# Patient Record
Sex: FEMALE
[De-identification: names, ages, dates, MRNs, and addresses within clinical notes are randomized per-mention and may not be internally consistent; named-entity substitution may affect disease eponyms.]

---

## 2023-08-23 PROBLEM — Z00.00 ENCOUNTER FOR PREVENTIVE HEALTH EXAMINATION: Status: ACTIVE | Noted: 2023-08-23

## 2023-08-25 ENCOUNTER — APPOINTMENT (OUTPATIENT)
Dept: GASTROENTEROLOGY | Facility: CLINIC | Age: 53
End: 2023-08-25
Payer: MEDICAID

## 2023-08-25 VITALS
SYSTOLIC BLOOD PRESSURE: 115 MMHG | BODY MASS INDEX: 27.31 KG/M2 | DIASTOLIC BLOOD PRESSURE: 70 MMHG | HEIGHT: 64 IN | RESPIRATION RATE: 15 BRPM | HEART RATE: 76 BPM | WEIGHT: 160 LBS | OXYGEN SATURATION: 98 % | TEMPERATURE: 97.6 F

## 2023-08-25 DIAGNOSIS — K83.1 OBSTRUCTION OF BILE DUCT: ICD-10-CM

## 2023-08-25 DIAGNOSIS — Z78.9 OTHER SPECIFIED HEALTH STATUS: ICD-10-CM

## 2023-08-25 DIAGNOSIS — K08.409 PARTIAL LOSS OF TEETH, UNSPECIFIED CAUSE, UNSPECIFIED CLASS: ICD-10-CM

## 2023-08-25 DIAGNOSIS — R10.11 RIGHT UPPER QUADRANT PAIN: ICD-10-CM

## 2023-08-25 DIAGNOSIS — K80.50 CALCULUS OF BILE DUCT W/OUT CHOLANGITIS OR CHOLECYSTITIS W/OUT OBSTRUCTION: ICD-10-CM

## 2023-08-25 PROCEDURE — 99204 OFFICE O/P NEW MOD 45 MIN: CPT

## 2023-08-25 NOTE — HISTORY OF PRESENT ILLNESS
[FreeTextEntry1] : Patient is a 53 year old female with a history of choledocholithiasis who presents due to referral from Dr. Emily Pitts for ERCP consult.  Patient states that she underwent lab evaluation that showed elevated LFTs.  She then underwent US of abdomen on 7/21/2023 that showed contracted gallbladder with gallstones and 1.0 cm CBD.  MR/MRCP with and without contrast on 08/16/2023 showed contracted gallbladder with multiple small gallstones, mild cental intrahepatic biliary ductal dilation, CBD dilation to 10 mm with 2 small 3 mm gallstones in the distal CBD.  Patient presents today stating that she is experiencing intermittent RUQ pain.  She is taking amoxicillin due to recent wisdom tooth extraction.  Patient underwent pre-surgical testing with Dr. Pitts today.  Patient states that her last colonoscopy was 2 years ago and was negative for any polyps.  Patient denies nausea, vomiting, changes in bowel habits, dark stool and BRBPR.  Patient admits to intact appetite and stable weight.  Patient denies family history of colon, gastric and pancreatic cancer.

## 2023-08-25 NOTE — REASON FOR VISIT
[Initial Evaluation] : an initial evaluation [Family Member] : family member [[employer] ] : provided by [employer]   [Interpreters_IDNumber] : [id number] [TWNoteComboBox1] : Lao

## 2023-08-25 NOTE — PHYSICAL EXAM
[Alert] : alert [No Acute Distress] : no acute distress [No Respiratory Distress] : no respiratory distress [Abdomen Tenderness] : non-tender [Abdomen Soft] : soft [Oriented To Time, Place, And Person] : oriented to person, place, and time

## 2023-08-25 NOTE — ASSESSMENT
[FreeTextEntry1] : Patient is a 53 year old female with a history of choledocholithiasis who presents due to referral from Dr. Emily Pitts for ERCP consult.  Patient to undergo ERCP for stone extraction at [facility].  R/B/C of procedure discussed with patient.  Escort for the procedure also reviewed.  Patient advised possiblity of stent placement and/or overnight stay in the hospital post procedure for observation.  Patient to undergo cholecystectomy with Dr. Pitts post ERCP.   Vivien GAMEZ; PA, am scribing for and in the presence of Dr. Yanick Downing the following sections: history of present illness, past medical/family/social history; review of systems; vital signs; physical exam; disposition.  Yanick GAMEZ MD, personally performed the services described in the documentation, reviewed the documentation recorded by the scribe in my presence and it accurately and completely records my words and actions.

## 2023-08-30 ENCOUNTER — NON-APPOINTMENT (OUTPATIENT)
Age: 53
End: 2023-08-30

## 2023-09-06 ENCOUNTER — TRANSCRIPTION ENCOUNTER (OUTPATIENT)
Age: 53
End: 2023-09-06

## 2023-09-06 VITALS
SYSTOLIC BLOOD PRESSURE: 120 MMHG | OXYGEN SATURATION: 98 % | TEMPERATURE: 97 F | RESPIRATION RATE: 16 BRPM | HEIGHT: 64 IN | HEART RATE: 60 BPM | DIASTOLIC BLOOD PRESSURE: 82 MMHG | WEIGHT: 159.17 LBS

## 2023-09-06 NOTE — ASU PATIENT PROFILE, ADULT - NSICDXPASTMEDICALHX_GEN_ALL_CORE_FT
PAST MEDICAL HISTORY:  Anxiety     Cholelithiasis     H/O varicose veins     HLD (hyperlipidemia)

## 2023-09-07 ENCOUNTER — TRANSCRIPTION ENCOUNTER (OUTPATIENT)
Age: 53
End: 2023-09-07

## 2023-09-07 ENCOUNTER — APPOINTMENT (OUTPATIENT)
Dept: GASTROENTEROLOGY | Facility: HOSPITAL | Age: 53
End: 2023-09-07
Payer: MEDICAID

## 2023-09-07 ENCOUNTER — OUTPATIENT (OUTPATIENT)
Dept: OUTPATIENT SERVICES | Facility: HOSPITAL | Age: 53
LOS: 1 days | Discharge: HOME | End: 2023-09-07
Payer: MEDICAID

## 2023-09-07 DIAGNOSIS — Z98.890 OTHER SPECIFIED POSTPROCEDURAL STATES: Chronic | ICD-10-CM

## 2023-09-07 DIAGNOSIS — Z86.79 PERSONAL HISTORY OF OTHER DISEASES OF THE CIRCULATORY SYSTEM: Chronic | ICD-10-CM

## 2023-09-07 PROCEDURE — 43262 ENDO CHOLANGIOPANCREATOGRAPH: CPT

## 2023-09-07 PROCEDURE — 88304 TISSUE EXAM BY PATHOLOGIST: CPT | Mod: 26

## 2023-09-07 PROCEDURE — 43264 ERCP REMOVE DUCT CALCULI: CPT

## 2023-09-07 PROCEDURE — 74328 X-RAY BILE DUCT ENDOSCOPY: CPT | Mod: 26

## 2023-09-07 DEVICE — HYDRATOME 44: Type: IMPLANTABLE DEVICE | Status: FUNCTIONAL

## 2023-09-07 DEVICE — BLLN EXTRCTR PRO RX-S 9-12MM: Type: IMPLANTABLE DEVICE | Status: FUNCTIONAL

## 2023-09-07 DEVICE — SURGIFLO HEMOSTATIC MATRIX KIT: Type: IMPLANTABLE DEVICE | Status: FUNCTIONAL

## 2023-09-07 RX ORDER — ONDANSETRON 8 MG/1
4 TABLET, FILM COATED ORAL EVERY 6 HOURS
Refills: 0 | Status: DISCONTINUED | OUTPATIENT
Start: 2023-09-07 | End: 2023-09-08

## 2023-09-07 RX ORDER — ACETAMINOPHEN 500 MG
1000 TABLET ORAL ONCE
Refills: 0 | Status: COMPLETED | OUTPATIENT
Start: 2023-09-08 | End: 2023-09-08

## 2023-09-07 RX ORDER — ACETAMINOPHEN 500 MG
1000 TABLET ORAL ONCE
Refills: 0 | Status: COMPLETED | OUTPATIENT
Start: 2023-09-07 | End: 2023-09-07

## 2023-09-07 RX ORDER — SODIUM CHLORIDE 9 MG/ML
1000 INJECTION, SOLUTION INTRAVENOUS
Refills: 0 | Status: DISCONTINUED | OUTPATIENT
Start: 2023-09-07 | End: 2023-09-08

## 2023-09-07 RX ORDER — HYDROMORPHONE HYDROCHLORIDE 2 MG/ML
0.25 INJECTION INTRAMUSCULAR; INTRAVENOUS; SUBCUTANEOUS ONCE
Refills: 0 | Status: DISCONTINUED | OUTPATIENT
Start: 2023-09-07 | End: 2023-09-07

## 2023-09-07 RX ORDER — HYDROMORPHONE HYDROCHLORIDE 2 MG/ML
0.2 INJECTION INTRAMUSCULAR; INTRAVENOUS; SUBCUTANEOUS EVERY 4 HOURS
Refills: 0 | Status: DISCONTINUED | OUTPATIENT
Start: 2023-09-07 | End: 2023-09-08

## 2023-09-07 RX ORDER — INFLUENZA VIRUS VACCINE 15; 15; 15; 15 UG/.5ML; UG/.5ML; UG/.5ML; UG/.5ML
0.5 SUSPENSION INTRAMUSCULAR ONCE
Refills: 0 | Status: DISCONTINUED | OUTPATIENT
Start: 2023-09-07 | End: 2023-09-08

## 2023-09-07 RX ORDER — OXYCODONE HYDROCHLORIDE 5 MG/1
2.5 TABLET ORAL ONCE
Refills: 0 | Status: DISCONTINUED | OUTPATIENT
Start: 2023-09-07 | End: 2023-09-07

## 2023-09-07 RX ADMIN — Medication 1000 MILLIGRAM(S): at 17:26

## 2023-09-07 RX ADMIN — OXYCODONE HYDROCHLORIDE 2.5 MILLIGRAM(S): 5 TABLET ORAL at 22:11

## 2023-09-07 RX ADMIN — HYDROMORPHONE HYDROCHLORIDE 0.2 MILLIGRAM(S): 2 INJECTION INTRAMUSCULAR; INTRAVENOUS; SUBCUTANEOUS at 12:51

## 2023-09-07 RX ADMIN — HYDROMORPHONE HYDROCHLORIDE 0.2 MILLIGRAM(S): 2 INJECTION INTRAMUSCULAR; INTRAVENOUS; SUBCUTANEOUS at 13:05

## 2023-09-07 RX ADMIN — HYDROMORPHONE HYDROCHLORIDE 0.2 MILLIGRAM(S): 2 INJECTION INTRAMUSCULAR; INTRAVENOUS; SUBCUTANEOUS at 18:50

## 2023-09-07 RX ADMIN — Medication 400 MILLIGRAM(S): at 17:06

## 2023-09-07 RX ADMIN — HYDROMORPHONE HYDROCHLORIDE 0.2 MILLIGRAM(S): 2 INJECTION INTRAMUSCULAR; INTRAVENOUS; SUBCUTANEOUS at 18:20

## 2023-09-07 RX ADMIN — Medication 400 MILLIGRAM(S): at 23:52

## 2023-09-07 RX ADMIN — HYDROMORPHONE HYDROCHLORIDE 0.25 MILLIGRAM(S): 2 INJECTION INTRAMUSCULAR; INTRAVENOUS; SUBCUTANEOUS at 13:32

## 2023-09-07 RX ADMIN — HYDROMORPHONE HYDROCHLORIDE 0.25 MILLIGRAM(S): 2 INJECTION INTRAMUSCULAR; INTRAVENOUS; SUBCUTANEOUS at 13:17

## 2023-09-07 RX ADMIN — OXYCODONE HYDROCHLORIDE 2.5 MILLIGRAM(S): 5 TABLET ORAL at 23:11

## 2023-09-07 NOTE — PATIENT PROFILE ADULT - PHONE #

## 2023-09-07 NOTE — PATIENT PROFILE ADULT - FALL HARM RISK - HARM RISK INTERVENTIONS

## 2023-09-07 NOTE — PATIENT PROFILE ADULT - IS PATIENT POST-MENOPAUSAL?
Cem EBONY Kevin,  It certainly was nice to meet you today.  Per our conversation you're having some pains in the chest.  This could represent heart blockages and the reason I am checking the angiogram of the heart.  In the interim increase the atorvastatin to 80 mg, and work on weight loss.  I will plan on seeing you afterwards.  Suresh Lee  
yes

## 2023-09-07 NOTE — BRIEF OPERATIVE NOTE - OPERATION/FINDINGS
Pt placed in reverse Trendelenburg w/ right side up. Omental attachments to GB were gently swept away. GB fundus retracted superiorly over dome of liver. Filmy adhesions between the GB & omentum/duo cauterized. GB infundibulum retracted laterally towards RLQ exposing Calot’s triangle. Critical view of safety obtained. Cystic duct and artery clipped and divided. Peritoneal attachments btw GB & liver bed  w/ electrocautery. Hemostasis achieved. No leakage of bile from cystic duct stump.    Small umbilical hernia noticed, umbilical stalk divided and defect closed with 0 Maxon suture

## 2023-09-07 NOTE — PRE-ANESTHESIA EVALUATION ADULT - BP NONINVASIVE SYSTOLIC (MM HG)
----- Message from [address] sent at 2/8/2023  8:58 AM EST -----  Regarding: care gap request  02/08/23 8:58 AM    Hello, our patient attached above has had CRC: Colonoscopy completed/performed  Please assist in updating the patient chart by pulling the Care Everywhere (CE) document  The date of service is 2/7/2023       Thank you,  Noemi Swann  [facility]
Upon review of the In Basket request we were able to locate, review, and update the patient chart as requested for CRC: Colonoscopy  Any additional questions or concerns should be emailed to the Practice Liaisons via the appropriate education email address, please do not reply via In Basket      Thank you  Gerda Trinh
120
[phone]

## 2023-09-07 NOTE — CONSULT NOTE ADULT - SUBJECTIVE AND OBJECTIVE BOX
54 yo F with PMH of Anxiety, HLD who was found to have Cholelithiases, Choledocholithiasis who underwent Laparoscopic Cholecystectomy and ERCP. Patient was also found to have an umbilical hernia which also was repaired. Patient did complain of some dizziness earlier today, which has resolved. She denies any abdominal pain or discomfort and states that her pain is being well managed. She did complain of urinary frequency, but denied any dysuria or hematuria. ROS as below.    PAST MEDICAL/SURGICAL HISTORY  PAST MEDICAL & SURGICAL HISTORY:  HLD (hyperlipidemia)  Anxiety  H/O varicose veins  Cholelithiasis  H/O salpingostomy  b/l  History of varicose veins  ablation    MEDICATIONS  (STANDING):  acetaminophen   IVPB .. 1000 milliGRAM(s) IV Intermittent once  influenza   Vaccine 0.5 milliLiter(s) IntraMuscular once  lactated ringers. 1000 milliLiter(s) (120 mL/Hr) IV Continuous <Continuous>    MEDICATIONS  (PRN):  HYDROmorphone  Injectable 0.2 milliGRAM(s) IV Push every 4 hours PRN Severe Pain (7 - 10)  ondansetron Injectable 4 milliGRAM(s) IV Push every 6 hours PRN Nausea      REVIEW OF SYSTEMS:  CONSTITUTIONAL: No fever, weight loss, or fatigue  RESPIRATORY: No cough; No shortness of breath  CARDIOVASCULAR: No chest pain, palpitations, but did have some dizziness  GASTROINTESTINAL: No abdominal or epigastric pain. No nausea, vomiting  GENITOURINARY: +frequency  NEUROLOGICAL: No headaches, memory loss, loss of strength  MUSCULOSKELETAL: No joint pain or swelling; No muscle, back, or extremity pain      T(C): 36.3 (09-07-23 @ 18:19), Max: 36.4 (09-07-23 @ 13:17)  HR: 66 (09-07-23 @ 18:19) (54 - 66)  BP: 137/90 (09-07-23 @ 18:19) (96/62 - 137/90)  RR: 18 (09-07-23 @ 18:19) (13 - 20)  SpO2: 96% (09-07-23 @ 18:19) (96% - 99%)  Wt(kg): --Vital Signs Last 24 Hrs  T(C): 36.3 (07 Sep 2023 18:19), Max: 36.4 (07 Sep 2023 13:17)  T(F): 97.4 (07 Sep 2023 18:19), Max: 97.5 (07 Sep 2023 13:17)  HR: 66 (07 Sep 2023 18:19) (54 - 66)  BP: 137/90 (07 Sep 2023 18:19) (96/62 - 137/90)  BP(mean): 76 (07 Sep 2023 14:54) (74 - 78)  RR: 18 (07 Sep 2023 18:19) (13 - 20)  SpO2: 96% (07 Sep 2023 18:19) (96% - 99%)    Parameters below as of 07 Sep 2023 18:19  Patient On (Oxygen Delivery Method): room air      Oxygen Saturation Index= Unable to calculate   [Based on FiO2 = Unknown, SpO2 = 96(09/07/2023 18:19), MAP = Unknown]  Daily Height in cm: 162.56 (07 Sep 2023 14:54)    Daily     PHYSICAL EXAM:  GENERAL: NAD  NERVOUS SYSTEM:  Alert & Oriented X3  CHEST/LUNG: CTAB; No rales, rhonchi, wheezing, or rubs  HEART: Regular rate and rhythm  ABDOMEN: Soft, Nontender, Nondistended; Bowel sounds hypoactive; incisions intact, dry and clean  EXTREMITIES: No clubbing, cyanosis, or edema      CAPILLARY BLOOD GLUCOSE    RADIOLOGY & ADDITIONAL TESTS:  ERCP Findings:    The duodenoscope was passed into the second portion of the duodenum. The major    papilla was identified and appeared unremarkable.  Using a sphincterotome a wire    was passed into the bile duct, the bile duct was readily cannulated and a    cholangiogram was produced by injecting contrast -- at least one filling defect    seen in the distal duct.  A sphincterotomy was performed without complications.    A balloon was used to perform a sweep of the bile duct. Two small stones were    removed. Occlusion cholangiogram showed no other filling defects.

## 2023-09-07 NOTE — PATIENT PROFILE ADULT - FUNCTIONAL ASSESSMENT - BASIC MOBILITY 6.
Not able to assess (calculate score using AMPAC averaging method) 3-calculated by average/Not able to assess (calculate score using [facility] averaging method)

## 2023-09-07 NOTE — BRIEF OPERATIVE NOTE - VENOUS THROMBOEMBOLISM PROPHYLAXIS THERAPY
SCDs Hatchet Flap Text: The defect edges were debeveled with a #15 scalpel blade.  Given the location of the defect, shape of the defect and the proximity to free margins a hatchet flap was deemed most appropriate.  Using a sterile surgical marker, an appropriate hatchet flap was drawn incorporating the defect and placing the expected incisions within the relaxed skin tension lines where possible.    The area thus outlined was incised deep to adipose tissue with a #15 scalpel blade.  The skin margins were undermined to an appropriate distance in all directions utilizing iris scissors.

## 2023-09-07 NOTE — CONSULT NOTE ADULT - ASSESSMENT
52 yo F with PMH of Anxiety, HLD who was found to have Cholelithiases, Choledocholithiasis who underwent Laparoscopic Cholecystectomy and ERCP. Patient was also found to have an umbilical hernia which also was repaired. Patient is doing well post-op, other than an episode of dizziness and urinary frequency. She denied any dysuria or hematuria but if patient still continues to have urinary urgency, we would recommend getting a urinalysis to further investigate. If positive, would recommend sending Urine Culture and starting Ceftriaxone. Follow up on WBC. Continue with adequate pain/nausea management regimen. Post-op care as per primary surgical team.    Thank you for this consultation. 54 yo F with PMH of Anxiety, HLD who was found to have Cholelithiases, Choledocholithiasis who underwent Laparoscopic Cholecystectomy and ERCP. Patient was also found to have an umbilical hernia which also was repaired. Patient is doing well post-op, other than an episode of dizziness and urinary frequency. She denied any dysuria or hematuria but if patient still continues to have urinary urgency, we would recommend getting a urinalysis to further investigate. If positive, would recommend sending Urine Culture and starting Ceftriaxone. Follow up on WBC. Continue with adequate pain/nausea management regimen. Post-op care as per primary surgical team.    Thank you for this consultation and please feel free to reach out with any questions.

## 2023-09-07 NOTE — PRE-ANESTHESIA EVALUATION ADULT - NSANTHPEFT_GEN_ALL_CORE
General: Appearance is consistent with chronological age. No abnormal facies.  EENT: Anicteric sclera; oropharynx clear, moist mucus membranes  Cardiovascular:  Rate and rhythm evaluated  Respiratory: Unlabored breathing  Neurological: Awake and alert, moves all extremities  Constitutional: MET>4
RRR  CTLAB

## 2023-09-07 NOTE — ASU DISCHARGE PLAN (ADULT/PEDIATRIC) - ASU DC SPECIAL INSTRUCTIONSFT
General Discharge Instructions:  Please resume all regular home medications unless specifically advised not to take a particular medication. Please take any new medications as prescribed.  Please get plenty of rest, continue to ambulate several times per day, and drink adequate amounts of fluids. Avoid lifting weights greater than 5-10 lbs until you follow-up with your surgeon, who will instruct you further regarding activity restrictions.  Avoid driving or operating heavy machinery while taking pain medications.  Please follow-up with your surgeon and Primary Care Provider (PCP) as advised.    Warning Signs:  Please call your doctor or nurse practitioner if you experience the following:  *You experience new chest pain, pressure, squeezing or tightness.  *New or worsening cough, shortness of breath, or wheeze.  If you are vomiting and cannot keep down fluids or your medications.  *You are getting dehydrated due to continued vomiting, diarrhea, or other reasons. Signs of dehydration include dry mouth, rapid heartbeat, or feeling dizzy or faint when standing.  You see blood or dark/black material when you vomit or have a bowel movement.  You experience burning when you urinate, have blood in your urine, or experience a discharge.  Your pain is not improving within 8-12 hours or is not gone within 24 hours. Call or return immediately if your pain is getting worse, changes location, or moves to your chest or back.  You have shaking chills, or fever greater than 101.5 degrees Fahrenheit or 38 degrees Celsius.  Any change in your symptoms, or any new symptoms that concern you.

## 2023-09-07 NOTE — ASU DISCHARGE PLAN (ADULT/PEDIATRIC) - NS MD DC FALL RISK RISK
For information on Fall & Injury Prevention, visit: [email or web address] OR  [email or web address] OR  https://www.cdc.gov/steadi/patient.html

## 2023-09-07 NOTE — PROGRESS NOTE ADULT - SUBJECTIVE AND OBJECTIVE BOX
Procedure: Laparoscopic cholecystectomy  Surgeon: Dr. Pitts    S: Pt seen in PACU. Pt has no complaints. Denies CP, SOB, EWING, calf tenderness. Pain controlled with medication.    O:  T(C): 36.4 (09-07-23 @ 13:17), Max: 36.4 (09-07-23 @ 13:17)  T(F): 97.5 (09-07-23 @ 13:17), Max: 97.5 (09-07-23 @ 13:17)  HR: 56 (09-07-23 @ 13:17) (55 - 58)  BP: 97/59 (09-07-23 @ 13:17) (97/59 - 109/59)  RR: 13 (09-07-23 @ 13:17) (13 - 20)  SpO2: 98% (09-07-23 @ 13:17) (97% - 99%)  Wt(kg): --            Gen: NAD, resting comfortably in bed  C/V: NSR  Pulm: Nonlabored breathing, no respiratory distress  Abd: soft, NT/ND Incision: clean, dry, intact   Extrem: WWP, no calf edema, SCDs in place      A/P: 53yFemale s/p above procedure  Diet: Regular, low fat   Pain/nausea control  Dispo plan: home

## 2023-09-07 NOTE — BRIEF OPERATIVE NOTE - NSICDXBRIEFPOSTOP_GEN_ALL_CORE_FT
POST-OP DIAGNOSIS:  Cholelithiases 07-Sep-2023 12:34:58  Cynthia Lee  Choledocholithiasis 07-Sep-2023 12:35:06  Cynthia Lee  Umbilical hernia 07-Sep-2023 12:35:28  Cynthia Lee

## 2023-09-07 NOTE — PRE-ANESTHESIA EVALUATION ADULT - NSANTHOSAYNRD_GEN_A_CORE
No. CAPO screening performed.  STOP BANG Legend: 0-2 = LOW Risk; 3-4 = INTERMEDIATE Risk; 5-8 = HIGH Risk
No. CAPO screening performed.  STOP BANG Legend: 0-2 = LOW Risk; 3-4 = INTERMEDIATE Risk; 5-8 = HIGH Risk

## 2023-09-07 NOTE — BRIEF OPERATIVE NOTE - NSICDXBRIEFPROCEDURE_GEN_ALL_CORE_FT
PROCEDURES:  Laparoscopic cholecystectomy 07-Sep-2023 12:34:19  Cynthia Lee  Repair, hernia, umbilical, with lipectomy 07-Sep-2023 12:35:17  Cynthia Lee

## 2023-09-07 NOTE — PRE-ANESTHESIA EVALUATION ADULT - NSANTHADDINFOFT_GEN_ALL_CORE
Risks, benefits and alternatives including but not limited to nausea, bleeding, and local trauma/positioning related injury discussed. All questions answered. The patient agrees to proceed. re-consented. however, previous still valid.

## 2023-09-07 NOTE — BRIEF OPERATIVE NOTE - NSICDXBRIEFPREOP_GEN_ALL_CORE_FT
PRE-OP DIAGNOSIS:  Cholelithiasis 07-Sep-2023 12:34:28  Cynthia Lee  Choledocholithiasis 07-Sep-2023 12:34:49  Cynthia Lee

## 2023-09-08 ENCOUNTER — TRANSCRIPTION ENCOUNTER (OUTPATIENT)
Age: 53
End: 2023-09-08

## 2023-09-08 VITALS
OXYGEN SATURATION: 97 % | SYSTOLIC BLOOD PRESSURE: 119 MMHG | TEMPERATURE: 98 F | DIASTOLIC BLOOD PRESSURE: 75 MMHG | RESPIRATION RATE: 18 BRPM | HEART RATE: 70 BPM

## 2023-09-08 LAB
ALBUMIN SERPL ELPH-MCNC: 3.3 G/DL — SIGNIFICANT CHANGE UP (ref 3.3–5)
ALP SERPL-CCNC: 68 U/L — SIGNIFICANT CHANGE UP (ref 40–120)
ALT FLD-CCNC: 24 U/L — SIGNIFICANT CHANGE UP (ref 10–45)
ANION GAP SERPL CALC-SCNC: 8 MMOL/L — SIGNIFICANT CHANGE UP (ref 5–17)
AST SERPL-CCNC: 22 U/L — SIGNIFICANT CHANGE UP (ref 10–40)
BASOPHILS # BLD AUTO: 0.02 K/UL — SIGNIFICANT CHANGE UP (ref 0–0.2)
BASOPHILS NFR BLD AUTO: 0.2 % — SIGNIFICANT CHANGE UP (ref 0–2)
BILIRUB DIRECT SERPL-MCNC: 0.2 MG/DL — SIGNIFICANT CHANGE UP (ref 0–0.3)
BILIRUB INDIRECT FLD-MCNC: 0.4 MG/DL — SIGNIFICANT CHANGE UP (ref 0.2–1)
BILIRUB SERPL-MCNC: 0.6 MG/DL — SIGNIFICANT CHANGE UP (ref 0.2–1.2)
BUN SERPL-MCNC: 11 MG/DL — SIGNIFICANT CHANGE UP (ref 7–23)
CALCIUM SERPL-MCNC: 8.8 MG/DL — SIGNIFICANT CHANGE UP (ref 8.4–10.5)
CHLORIDE SERPL-SCNC: 104 MMOL/L — SIGNIFICANT CHANGE UP (ref 96–108)
CO2 SERPL-SCNC: 25 MMOL/L — SIGNIFICANT CHANGE UP (ref 22–31)
CREAT SERPL-MCNC: 0.75 MG/DL — SIGNIFICANT CHANGE UP (ref 0.5–1.3)
EGFR: 95 ML/MIN/1.73M2 — SIGNIFICANT CHANGE UP
EOSINOPHIL # BLD AUTO: 0 K/UL — SIGNIFICANT CHANGE UP (ref 0–0.5)
EOSINOPHIL NFR BLD AUTO: 0 % — SIGNIFICANT CHANGE UP (ref 0–6)
GLUCOSE SERPL-MCNC: 101 MG/DL — HIGH (ref 70–99)
HCT VFR BLD CALC: 38 % — SIGNIFICANT CHANGE UP (ref 34.5–45)
HGB BLD-MCNC: 12.8 G/DL — SIGNIFICANT CHANGE UP (ref 11.5–15.5)
IMM GRANULOCYTES NFR BLD AUTO: 0.3 % — SIGNIFICANT CHANGE UP (ref 0–0.9)
LYMPHOCYTES # BLD AUTO: 18.1 % — SIGNIFICANT CHANGE UP (ref 13–44)
LYMPHOCYTES # BLD AUTO: 2.12 K/UL — SIGNIFICANT CHANGE UP (ref 1–3.3)
MAGNESIUM SERPL-MCNC: 1.8 MG/DL — SIGNIFICANT CHANGE UP (ref 1.6–2.6)
MCHC RBC-ENTMCNC: 29.2 PG — SIGNIFICANT CHANGE UP (ref 27–34)
MCHC RBC-ENTMCNC: 33.7 GM/DL — SIGNIFICANT CHANGE UP (ref 32–36)
MCV RBC AUTO: 86.6 FL — SIGNIFICANT CHANGE UP (ref 80–100)
MONOCYTES # BLD AUTO: 0.86 K/UL — SIGNIFICANT CHANGE UP (ref 0–0.9)
MONOCYTES NFR BLD AUTO: 7.3 % — SIGNIFICANT CHANGE UP (ref 2–14)
NEUTROPHILS # BLD AUTO: 8.7 K/UL — HIGH (ref 1.8–7.4)
NEUTROPHILS NFR BLD AUTO: 74.1 % — SIGNIFICANT CHANGE UP (ref 43–77)
NRBC # BLD: 0 /100 WBCS — SIGNIFICANT CHANGE UP (ref 0–0)
PHOSPHATE SERPL-MCNC: 3.6 MG/DL — SIGNIFICANT CHANGE UP (ref 2.5–4.5)
PLATELET # BLD AUTO: 258 K/UL — SIGNIFICANT CHANGE UP (ref 150–400)
POTASSIUM SERPL-MCNC: 4.3 MMOL/L — SIGNIFICANT CHANGE UP (ref 3.5–5.3)
POTASSIUM SERPL-SCNC: 4.3 MMOL/L — SIGNIFICANT CHANGE UP (ref 3.5–5.3)
PROT SERPL-MCNC: 6.2 G/DL — SIGNIFICANT CHANGE UP (ref 6–8.3)
RBC # BLD: 4.39 M/UL — SIGNIFICANT CHANGE UP (ref 3.8–5.2)
RBC # FLD: 12.8 % — SIGNIFICANT CHANGE UP (ref 10.3–14.5)
SODIUM SERPL-SCNC: 137 MMOL/L — SIGNIFICANT CHANGE UP (ref 135–145)
WBC # BLD: 11.73 K/UL — HIGH (ref 3.8–10.5)
WBC # FLD AUTO: 11.73 K/UL — HIGH (ref 3.8–10.5)

## 2023-09-08 PROCEDURE — 74328 X-RAY BILE DUCT ENDOSCOPY: CPT

## 2023-09-08 PROCEDURE — 80076 HEPATIC FUNCTION PANEL: CPT

## 2023-09-08 PROCEDURE — C1769: CPT

## 2023-09-08 PROCEDURE — 88304 TISSUE EXAM BY PATHOLOGIST: CPT

## 2023-09-08 PROCEDURE — 47562 LAPAROSCOPIC CHOLECYSTECTOMY: CPT

## 2023-09-08 PROCEDURE — 80048 BASIC METABOLIC PNL TOTAL CA: CPT

## 2023-09-08 PROCEDURE — 85025 COMPLETE CBC W/AUTO DIFF WBC: CPT

## 2023-09-08 PROCEDURE — 84100 ASSAY OF PHOSPHORUS: CPT

## 2023-09-08 PROCEDURE — C1889: CPT

## 2023-09-08 PROCEDURE — 36415 COLL VENOUS BLD VENIPUNCTURE: CPT

## 2023-09-08 PROCEDURE — 83735 ASSAY OF MAGNESIUM: CPT

## 2023-09-08 RX ORDER — MAGNESIUM SULFATE 500 MG/ML
1 VIAL (ML) INJECTION ONCE
Refills: 0 | Status: COMPLETED | OUTPATIENT
Start: 2023-09-08 | End: 2023-09-08

## 2023-09-08 RX ADMIN — Medication 100 GRAM(S): at 14:30

## 2023-09-08 RX ADMIN — Medication 400 MILLIGRAM(S): at 10:21

## 2023-09-08 RX ADMIN — Medication 1000 MILLIGRAM(S): at 13:00

## 2023-09-08 RX ADMIN — Medication 400 MILLIGRAM(S): at 04:40

## 2023-09-08 NOTE — PROGRESS NOTE ADULT - ASSESSMENT
54 yo F with PMH of Anxiety, HLD who was found to have Cholelithiases, Choledocholithiasis who underwent Laparoscopic Cholecystectomy and ERCP. Patient was also found to have an umbilical hernia which also was repaired. Patient is doing well post-op other than urinary frequency and some mild bloating. She denied any dysuria or hematuria but as patient still continues to have urinary urgency and frequency, we would recommend getting a urinalysis to further investigate. If positive, would recommend sending Urine Culture and starting Ceftriaxone. Follow up on WBC. Continue with adequate pain/nausea management regimen. Advance diet as tolerated. Post-op care as per primary surgical team.    Thank you for this consultation and please feel free to reach out with any questions.
53 year old woman with past medical history of cholelithiasis, choledocholithiasis presents for elective lap glenn on 9/7. Plan for ERCP post lap glenn.    CLD/IVF  Pain/nausea control prn  ERCP 9/7  SCDs  TOV pending  AM labs

## 2023-09-08 NOTE — DISCHARGE NOTE NURSING/CASE MANAGEMENT/SOCIAL WORK - PATIENT PORTAL LINK FT
You can access the FollowMyHealth Patient Portal offered by [facility] by registering at the following website: [email or web address]. By joining [facility]’s FollowMyHealth portal, you will also be able to view your health information using other applications (apps) compatible with our system.

## 2023-09-08 NOTE — PROGRESS NOTE ADULT - SUBJECTIVE AND OBJECTIVE BOX
54 yo F with PMH of Anxiety, HLD who was found to have Cholelithiases, Choledocholithiasis who underwent Laparoscopic Cholecystectomy and ERCP. Patient was also found to have an umbilical hernia which also was repaired. Patient still complains of urinary frequency but now states that she also feels bloated and has mild abdominal pain. Denies any nausea, vomiting, chest pain or shortness of breath.    PAST MEDICAL/SURGICAL HISTORY  PAST MEDICAL & SURGICAL HISTORY:  HLD (hyperlipidemia)  Anxiety  H/O varicose veins  Cholelithiasis  H/O salpingostomy  b/l  History of varicose veins  ablation    MEDICATIONS  (STANDING):  acetaminophen   IVPB .. 1000 milliGRAM(s) IV Intermittent once  influenza   Vaccine 0.5 milliLiter(s) IntraMuscular once  lactated ringers. 1000 milliLiter(s) (120 mL/Hr) IV Continuous <Continuous>    MEDICATIONS  (PRN):  HYDROmorphone  Injectable 0.2 milliGRAM(s) IV Push every 4 hours PRN Severe Pain (7 - 10)  ondansetron Injectable 4 milliGRAM(s) IV Push every 6 hours PRN Nausea    T(C): 36.2 (09-08-23 @ 05:16), Max: 36.4 (09-07-23 @ 13:17)  HR: 68 (09-08-23 @ 05:16) (54 - 68)  BP: 124/76 (09-08-23 @ 05:16) (96/62 - 137/90)  RR: 18 (09-08-23 @ 05:16) (13 - 20)  SpO2: 97% (09-08-23 @ 05:16) (96% - 99%)  Wt(kg): --Vital Signs Last 24 Hrs  T(C): 36.2 (08 Sep 2023 05:16), Max: 36.4 (07 Sep 2023 13:17)  T(F): 97.2 (08 Sep 2023 05:16), Max: 97.6 (08 Sep 2023 00:33)  HR: 68 (08 Sep 2023 05:16) (54 - 68)  BP: 124/76 (08 Sep 2023 05:16) (96/62 - 137/90)  BP(mean): 97 (07 Sep 2023 21:14) (74 - 97)  RR: 18 (08 Sep 2023 05:16) (13 - 20)  SpO2: 97% (08 Sep 2023 05:16) (96% - 99%)    Parameters below as of 08 Sep 2023 05:16  Patient On (Oxygen Delivery Method): room air      Oxygen Saturation Index= Unable to calculate   [Based on FiO2 = Unknown, SpO2 = 97(09/08/2023 05:16), MAP = Unknown]  Daily Height in cm: 162.56 (07 Sep 2023 14:54)    Daily     PHYSICAL EXAM:  GENERAL: NAD  NERVOUS SYSTEM:  Alert & Oriented X3  CHEST/LUNG: CTAB; No rales, rhonchi, wheezing, or rubs  HEART: Regular rate and rhythm  ABDOMEN: Soft, mild tenderness over incisions, Nondistended; Bowel sounds hypoactive; incisions intact, dry and clean  EXTREMITIES: No clubbing, cyanosis, or edema    CAPILLARY BLOOD GLUCOSE      LABS:  CBC   09-08-23 @ 08:19  Hematcorit 38.0  Hemoglobin 12.8  Mean Cell Hemoglobin 29.2  Platelet Count-Automated 258  RBC Count 4.39  Red Cell Distrib Width 12.8  Wbc Count 11.73      BMP  09-08-23 @ 08:19  Anion Gap. Serum 8  Blood Urea Nitrogen,Serm 11  Calcium, Total Serum 8.8  Carbon Dioxide, Serum 25  Chloride, Serum 104  Creatinine, Serum 0.75  eGFR in  --  eGFR in Non Afican American --  Gloucose, serum 101  Potassium, Serum 4.3  Sodium, Serum 137    CMP  09-08-23 @ 08:19  Kary Aminotransferase(ALT/SGPT)24  Albumin, Serum 3.3  Alkaline Phosphatase, Serum 68  Anion Gap, Serum 8  Aspartate Aminotransferase (AST/SGOT)22  Bilirubin Total, Serum 0.6  Blood Urea Nitrogen, Serum 11  Calcium,Total Serum 8.8  Carbon Dioxide, Serum 25  Chloride, Serum 104  Creatinine, Serum 0.75  eGFR if  --  eGFR if Non African American --  Glucose, Serum 101  Potassium, Serum 4.3  Protein Total, Serum 6.2  Sodium, Serum 137      RADIOLOGY & ADDITIONAL TESTS:  ERCP Findings:    The duodenoscope was passed into the second portion of the duodenum. The major    papilla was identified and appeared unremarkable.  Using a sphincterotome a wire    was passed into the bile duct, the bile duct was readily cannulated and a    cholangiogram was produced by injecting contrast -- at least one filling defect    seen in the distal duct.  A sphincterotomy was performed without complications.    A balloon was used to perform a sweep of the bile duct. Two small stones were    removed. Occlusion cholangiogram showed no other filling defects.

## 2023-09-08 NOTE — PROGRESS NOTE ADULT - SUBJECTIVE AND OBJECTIVE BOX
SUBJECTIVE:      MEDICATIONS  (STANDING):  acetaminophen   IVPB .. 1000 milliGRAM(s) IV Intermittent once  influenza   Vaccine 0.5 milliLiter(s) IntraMuscular once  lactated ringers. 1000 milliLiter(s) (120 mL/Hr) IV Continuous <Continuous>    MEDICATIONS  (PRN):  HYDROmorphone  Injectable 0.2 milliGRAM(s) IV Push every 4 hours PRN Severe Pain (7 - 10)  ondansetron Injectable 4 milliGRAM(s) IV Push every 6 hours PRN Nausea      Vital Signs Last 24 Hrs  T(C): 36.2 (08 Sep 2023 05:16), Max: 36.4 (07 Sep 2023 13:17)  T(F): 97.2 (08 Sep 2023 05:16), Max: 97.6 (08 Sep 2023 00:33)  HR: 68 (08 Sep 2023 05:16) (54 - 68)  BP: 124/76 (08 Sep 2023 05:16) (96/62 - 137/90)  BP(mean): 97 (07 Sep 2023 21:14) (74 - 97)  RR: 18 (08 Sep 2023 05:16) (13 - 20)  SpO2: 97% (08 Sep 2023 05:16) (96% - 99%)    Parameters below as of 08 Sep 2023 05:16  Patient On (Oxygen Delivery Method): room air        Physical Exam:  General: NAD, resting comfortably in bed  Pulmonary: Nonlabored breathing, no respiratory distress  Cardiovascular: NSR  Abdominal: soft, NT/ND  Extremities: WWP, normal strength  Neuro: A/O x 3, CNs II-XII grossly intact, no focal deficits    I&O's Summary    07 Sep 2023 07:01  -  08 Sep 2023 07:00  --------------------------------------------------------  IN: 1080 mL / OUT: 1750 mL / NET: -670 mL        LABS:              CAPILLARY BLOOD GLUCOSE            RADIOLOGY & ADDITIONAL STUDIES:   SUBJECTIVE:  Pt seen this AM on rounds. Pt tolerating PO intake; -n/-v, +f/-bm     MEDICATIONS  (STANDING):  acetaminophen   IVPB .. 1000 milliGRAM(s) IV Intermittent once  influenza   Vaccine 0.5 milliLiter(s) IntraMuscular once  lactated ringers. 1000 milliLiter(s) (120 mL/Hr) IV Continuous <Continuous>    MEDICATIONS  (PRN):  HYDROmorphone  Injectable 0.2 milliGRAM(s) IV Push every 4 hours PRN Severe Pain (7 - 10)  ondansetron Injectable 4 milliGRAM(s) IV Push every 6 hours PRN Nausea      Vital Signs Last 24 Hrs  T(C): 36.2 (08 Sep 2023 05:16), Max: 36.4 (07 Sep 2023 13:17)  T(F): 97.2 (08 Sep 2023 05:16), Max: 97.6 (08 Sep 2023 00:33)  HR: 68 (08 Sep 2023 05:16) (54 - 68)  BP: 124/76 (08 Sep 2023 05:16) (96/62 - 137/90)  BP(mean): 97 (07 Sep 2023 21:14) (74 - 97)  RR: 18 (08 Sep 2023 05:16) (13 - 20)  SpO2: 97% (08 Sep 2023 05:16) (96% - 99%)    Parameters below as of 08 Sep 2023 05:16  Patient On (Oxygen Delivery Method): room air        Physical Exam:  General: NAD, resting comfortably in bed  Pulmonary: Nonlabored breathing, no respiratory distress  Cardiovascular: NSR  Abdominal: soft, niki-incisional ttp, ND  Extremities: WWP, normal strength  Neuro: A/O x 3, CNs II-XII grossly intact, no focal deficits    I&O's Summary    07 Sep 2023 07:01  -  08 Sep 2023 07:00  --------------------------------------------------------  IN: 1080 mL / OUT: 1750 mL / NET: -670 mL        LABS:              CAPILLARY BLOOD GLUCOSE            RADIOLOGY & ADDITIONAL STUDIES:

## 2023-09-14 LAB
SURGICAL PATHOLOGY STUDY: SIGNIFICANT CHANGE UP

## 2023-11-20 RX ORDER — ZINC ACETATE DIHYDRATE 100 %
1 CRYSTALS MISCELLANEOUS
Refills: 0 | DISCHARGE

## 2023-11-22 ENCOUNTER — APPOINTMENT (OUTPATIENT)
Dept: GASTROENTEROLOGY | Facility: CLINIC | Age: 53
End: 2023-11-22

## (undated) DEVICE — DRSG DERMABOND 0.7ML

## (undated) DEVICE — TROCAR COVIDIEN VERSAPORT BLADELESS OPTICAL 5MM STANDARD

## (undated) DEVICE — SPONGE ENDO PEANUT 5MM

## (undated) DEVICE — Device

## (undated) DEVICE — PACK GENERAL LAPAROSCOPY

## (undated) DEVICE — ENDOCATCH 10MM SPECIMEN POUCH

## (undated) DEVICE — TIP METZENBAUM SCISSOR MONOPOLAR ENDOCUT (ORANGE)

## (undated) DEVICE — VENODYNE/SCD SLEEVE CALF MEDIUM

## (undated) DEVICE — SOL IRR BAG NS 0.9% 3000ML

## (undated) DEVICE — MARKING PEN W RULER

## (undated) DEVICE — TUBING STRYKER PNEUMOSURE HI FLOW INSUFFLATOR

## (undated) DEVICE — WARMING BLANKET UPPER ADULT

## (undated) DEVICE — ELCTR GROUNDING PAD ADULT COVIDIEN

## (undated) DEVICE — TROCAR COVIDIEN VERSAPORT BLADELESS OPTICAL 12MM STANDARD

## (undated) DEVICE — SUT MONOCRYL 4-0 18" PS-2

## (undated) DEVICE — D HELP - CLEARVIEW CLEARIFY SYSTEM

## (undated) DEVICE — POSITIONER FOAM EGG CRATE ULNAR 2PCS (PINK)

## (undated) DEVICE — SUT VICRYL 0 27" UR-6